# Patient Record
Sex: MALE | Race: WHITE
[De-identification: names, ages, dates, MRNs, and addresses within clinical notes are randomized per-mention and may not be internally consistent; named-entity substitution may affect disease eponyms.]

---

## 2022-03-25 ENCOUNTER — HOSPITAL ENCOUNTER (EMERGENCY)
Dept: HOSPITAL 47 - EC | Age: 42
Discharge: HOME | End: 2022-03-25
Payer: COMMERCIAL

## 2022-03-25 VITALS — TEMPERATURE: 98.6 F | HEART RATE: 67 BPM

## 2022-03-25 VITALS — SYSTOLIC BLOOD PRESSURE: 145 MMHG | RESPIRATION RATE: 18 BRPM | DIASTOLIC BLOOD PRESSURE: 86 MMHG

## 2022-03-25 DIAGNOSIS — Z48.00: Primary | ICD-10-CM

## 2022-03-25 DIAGNOSIS — Z91.040: ICD-10-CM

## 2022-03-25 PROCEDURE — 99282 EMERGENCY DEPT VISIT SF MDM: CPT

## 2022-03-25 NOTE — ED
General Adult HPI





- General


Chief complaint: Skin/Abscess/Foreign Body


Stated complaint: surgical glue came unstuck


Time Seen by Provider: 03/25/22 17:45


Source: patient


Mode of arrival: ambulatory


Limitations: no limitations





- History of Present Illness


Initial comments: 





This 41-year-old male presents emergency Department with complaint of drainage 

from laparoscopic hernia surgery.  Patient states he underwent laparoscopic 

hernia surgery 2 left side of his abdomen and mid February.  Patient states the 

laparoscopic incision sites were adhered with Dermabond which did feel off on 

its own about 2 weeks ago.  Patient states he did see his surgeon,  

around that time, however the glue was still on during that follow-up 

appointment.  Patient states since the glue has peeled off he has been noticing 

some mild drainage from the sites.  He states that there are 3 incision sites.  

Patient states he did get an ALLERGIC reaction from Band-Aids over the last week

and did have some itching around where his bandages were placed which Dr. barajas

instructed him to get Benadryl cream for.  Patient states he has been using 

Benadryl cream on his skin and has been using triple antibiotic ointment along 

with hydrogen peroxide on the actual incision sites.  Patient denies any pain or

swelling around the incision sites.  Patient denies any fevers.  Patient denies 

any chest pain, shortness of breath, nausea, vomiting, change in bowel or 

bladder, change in appetite, headache, lightheadedness, dizziness.





- Related Data


                                    Allergies











Allergy/AdvReac Type Severity Reaction Status Date / Time


 


adhesive tape AdvReac  Rash/Hives Verified 03/25/22 16:03














Review of Systems


ROS Statement: 


Those systems with pertinent positive or pertinent negative responses have been 

documented in the HPI.





ROS Other: All systems not noted in ROS Statement are negative.





Past Medical History


Past Medical History: No Reported History


History of Any Multi-Drug Resistant Organisms: None Reported


Past Surgical History: No Surgical Hx Reported, Hernia Repair


Past Psychological History: No Psychological Hx Reported


Smoking Status: Never smoker


Past Alcohol Use History: Occasional


Past Drug Use History: None Reported





General Exam


Limitations: no limitations


General appearance: alert, in no apparent distress


Head exam: Present: atraumatic, normocephalic, normal inspection


Eye exam: Present: normal appearance, PERRL, EOMI.  Absent: scleral icterus, 

conjunctival injection, periorbital swelling


ENT exam: Present: normal exam, mucous membranes moist


Neck exam: Present: normal inspection, full ROM.  Absent: tenderness, 

meningismus, lymphadenopathy


Respiratory exam: Present: normal lung sounds bilaterally.  Absent: respiratory 

distress, wheezes, rales, rhonchi, stridor


Cardiovascular Exam: Present: regular rate, normal rhythm, normal heart sounds. 

Absent: systolic murmur, diastolic murmur, rubs, gallop, clicks


GI/Abdominal exam: Present: soft, normal bowel sounds, other (Patient with three

1cm incisions on left side of abdomen. Mild erythema around lowest incision in 

the shape of Band-Aid that was placed over incision. No pus, bleeding/bruising 

from incision sites. No tenderness, warmth or sign of infection around incision 

sites. Incision sites closed+scab formation).  Absent: distended, tenderness 

(Patient without tenderness abdomen.  3 incision sites present without any 

drainage, bleeding or pus at this time.  No sign of infection over incision 

sites.), guarding, rebound, rigid


Extremities exam: Present: normal inspection, full ROM, normal capillary refill.

 Absent: tenderness, pedal edema, joint swelling, calf tenderness


Back exam: Absent: CVA tenderness (R), CVA tenderness (L), paraspinal 

tenderness, vertebral tenderness


Neurological exam: Present: alert, oriented X3, CN II-XII intact


Psychiatric exam: Present: normal affect, normal mood


Skin exam: Present: warm, dry, intact, normal color.  Absent: rash





Course


                                   Vital Signs











  03/25/22





  15:59


 


Temperature 98.6 F


 


Pulse Rate 67


 


Respiratory 16





Rate 


 


Blood Pressure 158/95














Medical Decision Making





- Medical Decision Making





This 41-year-old female presents emergency Department wondering if he should 

have his incision sites reclosed after hernia surgery in mid February.  On 

examination of 3 abdominal incision sites there is no sign of infection.  Scab 

formation present over all 3 incision sites, however patient states that the 

scabs to often fall off and the edges of the incisions seem to slightly open, 

however there is no sign of this at this time.  Patient denies any fever, 

bleeding of incisions, pain around the incisions.  Bacitracin ointment placed 

over her incision sites.  Patient instructed not to put hydrogen peroxide over 

incisions.  I did instruct patient to not use Band-Aids that caused erythema

/blistering to his abdomen earlier this week.  Patient instructed to follow up 

with his primary care provider or surgeon next week.  Strict return precautions 

were discussed.  Patient verbally agreed to plan.  Patient sent home in stable 

condition.  Case discussed in detail with my attending,  who suggested 

that I do not place anything over top of incisions and just apply bacitracin 

ointment.





Disposition


Clinical Impression: 


 Encounter for wound re-check





Disposition: HOME SELF-CARE


Condition: Stable


Instructions (If sedation given, give patient instructions):  Bacitracin (On the

skin), Incisional Hernia (DC)


Additional Instructions: 


Please follow-up with your primary care provider or  early next week.  

Return to the emergency department with any new, worsening, or concerning 

symptoms.  Apply bacitracin ointment as directed


Is patient prescribed a controlled substance at d/c from ED?: No


Referrals: 


None,Stated [Primary Care Provider] - 1-2 days


Paul Aguila [STAFF PHYSICIAN] - 1-2 days


Time of Disposition: 18:42

## 2023-08-04 ENCOUNTER — HOSPITAL ENCOUNTER (OUTPATIENT)
Dept: HOSPITAL 47 - RADUSWWP | Age: 43
Discharge: HOME | End: 2023-08-04
Attending: INTERNAL MEDICINE
Payer: COMMERCIAL

## 2023-08-04 DIAGNOSIS — Z87.19: ICD-10-CM

## 2023-08-04 DIAGNOSIS — K76.0: Primary | ICD-10-CM

## 2023-08-04 PROCEDURE — 76700 US EXAM ABDOM COMPLETE: CPT

## 2023-08-04 PROCEDURE — 76857 US EXAM PELVIC LIMITED: CPT

## 2023-08-04 NOTE — US
EXAMINATION TYPE: US abdomen comp/pelvis limited

 

DATE OF EXAM: 8/4/2023

 

COMPARISON: NONE

 

CLINICAL INDICATION: Male, 42 years old with history of R10.84 GENERALIZED ABDOMINAL PAIN; Generalize
d pain. Hx umbilical hernia. 

 

EXAM MEASUREMENTS:

 

Liver Length:  16.0 cm   

Gallbladder Wall:  0.1 cm   

CBD:  0.3 cm

Spleen:  12.7 cm   

Right Kidney:  10.4 x 4.8 x 5.5 cm 

Left Kidney:  11.2 x 4.9 x 5.1 cm  

 

Pancreas:  Head and tail obscured by bowel gas

Liver:  Echogenic, heterogenous and coarse  

Gallbladder:  No stones, sludge or wall thickening seen

CBD:  wnl 

Spleen:  wnl   

Right Kidney:  No hydronephrosis or masses seen   

Left Kidney:  Medial lower pole anechoic lesion = 1.8 x 1.7 x 1.6 cm    

Upper IVC:  wnl  

Abd Aorta:  Mid portion obscured by overlying bowel gas

Bladder: anechoic

**Left jet seen

 

The visualized portions of the pancreas unremarkable. The tail and head are obscured by overlying bow
el gas. The liver demonstrates an echogenic heterogenous and coarse appearance without focal lesion. 
Gallbladder is unremarkable without evidence of pericholecystic fluid, cholelithiasis, or wall thicke
angelia. Common bile duct within normal limits. Spleen is within normal limits. 

 

Both kidneys dimension no evidence of solid mass, hydronephrosis, nephrolithiasis. Simple cyst identi
fied within the left mid kidney measuring up to 1.8 cm. The visualized portions of the upper IVC and 
abdominal aorta are within normal limits. The midportion of the abdominal aorta is obscured by overly
ing bowel gas. The bladder is anechoic with only the left lateral jet identified.

 

IMPRESSION: 

1. No acute process.

2. Hepatic steatosis.

## 2025-02-25 ENCOUNTER — HOSPITAL ENCOUNTER (OUTPATIENT)
Dept: HOSPITAL 47 - 3 N SLEEP | Age: 45
End: 2025-02-25
Attending: INTERNAL MEDICINE
Payer: COMMERCIAL

## 2025-02-25 VITALS
HEART RATE: 67 BPM | TEMPERATURE: 98 F | SYSTOLIC BLOOD PRESSURE: 130 MMHG | RESPIRATION RATE: 16 BRPM | DIASTOLIC BLOOD PRESSURE: 83 MMHG

## 2025-02-25 DIAGNOSIS — R06.83: Primary | ICD-10-CM

## 2025-02-25 DIAGNOSIS — F41.9: ICD-10-CM

## 2025-02-25 DIAGNOSIS — Z91.09: ICD-10-CM

## 2025-02-25 PROCEDURE — 99211 OFF/OP EST MAY X REQ PHY/QHP: CPT

## 2025-02-25 NOTE — P.SLEEP
History of Present Illness


H&P Date: 02/25/25





44-year-old young male patient referred to me due to concerns of sleep apnea.  

The patient is experiencing excessive fatigue and sleepiness during the day and 

his current Kendalia score is at 16.  He has loud snoring.  He has been noted to 

have occasional apneas by family members.  He also wakes up with a dry mouth.  

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  He works 4 

days a week.  On working days, he goes to bed between 830 and 9 AM and he gets 

out of bed at around 2 PM.  On days off, he goes to bed around 1 AM and usually 

gets out of bed between 6 and 7 AM in the morning.  As such, he is averaging 6 

hours of sleep.  Takes a few minutes to fall asleep.  He sleeps on his back and 

he naps whenever he can.  These are short naps.  His weight has remained stable 

over the past 5 years without any significant weight gain or weight loss.  He 

drinks alcohol socially.  Occasionally, he consumes an energy drink to get 

himself stimulated.  No substance abuse.  No alcoholism.  No smoking.  No head 

trauma.  No sleep paralysis.  No hallucinations.  No cataplexy.  No sleepwalking

or sleep talking.  No nighttime awakening for shortness of breath or chest pain.

 No personal or family history of obstructive sleep apnea.  No other major 

comorbidities.  Due to concern of sleep apnea, the patient was referred to me.





Review of Systems


Constitutional: Reports daytime sleepiness, Reports fatigue


Eyes: denies as per HPI, denies blurred vision, denies bulging eye, denies 

decreased vision, denies diplopia, denies discharge, denies dry eye, denies 

irritation, denies itching, denies pain, denies photophobia, denies loss of 

peripheral vision, denies loss of vision, denies tunnel vision/blind spots


Ears: deny: decreased hearing, ear discharge, earache, tinnitus


Ears, nose, mouth and throat: Reports as per HPI


Cardiovascular: Reports as per HPI


Respiratory: Reports snoring


Gastrointestinal: Reports as per HPI


Genitourinary: Reports as per HPI


Musculoskeletal: Reports as per HPI


Musculoskeletal: absent: ankle pain, ankle stiffness, ankle swelling, as per 

HPI, elbow pain, elbow stiffness, elbow swelling, foot pain, foot stiffness, 

foot swelling, hand pain, hand stiffness, hand swelling, hip pain, hip st

iffness, hip swelling, knee pain, knee stiffness, knee swelling, shoulder pain, 

shoulder stiffness, shoulder swelling, wrist pain, wrist stiffness, wrist 

swelling


Integumentary: Reports as per HPI


Neurological: Reports as per HPI


Psychiatric: Reports as per HPI, Reports hypersomnia, Reports sleep disturbances


Endocrine: Reports as per HPI


Hematologic/Lymphatic: Reports as per HPI


Allergic/Immunologic: Reports as per HPI





Past Medical History


Past Medical History: No Reported History


Additional Past Medical History / Comment(s): Arthritis - thumbs, 3 knee 

surgeries - arthritis now.


History of Any Multi-Drug Resistant Organisms: None Reported


Past Surgical History: Hernia Repair


Additional Past Surgical History / Comment(s): 2 left ACL knee surgeries, 1 

right knee meniscus, umbilical hernia, bilateral lasik eye surgery, wisdom 

teeth, kidney stones


Past Psychological History: No Psychological Hx Reported


Smoking Status: Never smoker


Past Alcohol Use History: Occasional


Past Drug Use History: None Reported





- Past Family History


  ** Father


Family Medical History: Hypertension


Additional Family Medical History / Comment(s): (Sister also with HTN and heart 

problem)





  ** Mother


Family Medical History: CVA/TIA, Diabetes Mellitus





Medications and Allergies


Home Medications and Allergies Comment(s): 





Vitamin D and probiotic


                                    Allergies











Allergy/AdvReac Type Severity Reaction Status Date / Time


 


adhesive tape AdvReac  Rash/Hives Verified 03/25/22 16:03














Physical Exam


Vitals: 


                                   Vital Signs











  Temp Pulse Resp BP Pulse Ox


 


 02/25/25 14:37  98.0 F  67  16  130/83  96








                                Intake and Output











 02/25/25 02/25/25 02/25/25





 06:59 14:59 22:59


 


Other:   


 


  Weight  90.889 kg 














The patient appeared well nourished and normally developed. Vital signs as 

documented.  Body mass index is 29.6


Head exam is unremarkable. No scleral icterus or corneal arcus noted. 


Neck is without jugular venous distension, thyromegaly, or carotid bruits. 

Carotid upstrokes are brisk bilaterally.  The patient has a Mallampati class III


Lungs are clear to auscultation and percussion. 


Cardiac exam reveals the PMI to be normally sized and situated. Rhythm is 

regular. First and second heart sounds normal. No murmurs, rubs or gallops. 


Abdominal exam reveals normal bowel sounds, no masses, no organomegaly and no 

aortic enlargement. 


Extremities are nonedematous and both femoral and pedal pulses are normal.


Examination of the skin revealed no evidence of significant rashes, suspicious 

appearing nevi or other concerning lesions.


Neurologically, the patient is awake and alert and the patient does not have any

focal neurological deficit.  Cranial nerves are essentially intact.





Assessment and Plan


Plan: 





Chronic fatigue/sleepiness with an Kendalia score of 16, suspect obstructive 

sleep apnea.





Snoring with questionable apneas overnight.





Night shift worker





No major comorbidities.  Patient is otherwise healthy





Plan





The patient was given advised on sleep knowing that he is a midnight shift 

worker.  He needs to aim for a consistent sleep schedule even on days off.  

During those working days, the patient needs to go to bed in the early morning 

hours and block out daytime noise to ensure sleep quality.


Patient was advised to eat balanced meals with protein and complex carbohydrates

to maintain energy levels and stay hydrated throughout the midnight shift


Advised to limit caffeine intake specially close to bedtime as this may disrupt 

his sleep


Advised to engage in moderate exercise after night shift to promote healthy sle

ep


May utilize breaks times for short naps and dark and quiet spaces


Proceed with a screening polysomnogram.  This will be a nighttime study to rule 

out the possibility of obstructive sleep apnea and treat accordingly.





Sleep Note





- Sleep Data


ESS Total: 16





- Sleep Note


Sleep Note: 


Temperature: 98.0 F


Pulse Rate: 67


Respiratory Rate: 16


Blood Pressure: 130/83


SpO2: 96


Height: 5 ft 9 in


Weight: 90.889 kg


BMI: 


Neck Circumference: 17

## 2025-03-25 ENCOUNTER — HOSPITAL ENCOUNTER (OUTPATIENT)
Dept: HOSPITAL 47 - 3 N SLEEP | Age: 45
LOS: 1 days | Discharge: HOME | End: 2025-03-26
Attending: INTERNAL MEDICINE
Payer: COMMERCIAL

## 2025-03-25 DIAGNOSIS — Z91.048: ICD-10-CM

## 2025-03-25 DIAGNOSIS — R53.82: ICD-10-CM

## 2025-03-25 DIAGNOSIS — G47.33: Primary | ICD-10-CM

## 2025-03-25 PROCEDURE — 95810 POLYSOM 6/> YRS 4/> PARAM: CPT

## 2025-04-06 NOTE — P.PCN
Date of Procedure: 03/25/25


Operative Findings: 











Polysomnography report





Date of service is 3/25/2025





History


44-year-old young male patient referred to me due to concerns of sleep apnea.  

The patient is experiencing excessive fatigue and sleepiness during the day and 

his current Regina score is at 16.  He has loud snoring.  He has been noted to 

have occasional apneas by family members.  He also wakes up with a dry mouth.  

He works at the [employer] and currently is undergoing midnight shift.  He works 4 

days a week.  On working days, he goes to bed between 830 and 9 AM and he gets 

out of bed at around 2 PM.  On days off, he goes to bed around 1 AM and usually 

gets out of bed between 6 and 7 AM in the morning.  As such, he is averaging 6 

hours of sleep.  Takes a few minutes to fall asleep.  He sleeps on his back and 

he naps whenever he can.  These are short naps.  His weight has remained stable 

over the past 5 years without any significant weight gain or weight loss.  He 

drinks alcohol socially.  Occasionally, he consumes an energy drink to get 

himself stimulated.  No substance abuse.  No alcoholism.  No smoking.  No head 

trauma.  No sleep paralysis.  No hallucinations.  No cataplexy.  No sleepwalking

or sleep talking.  No nighttime awakening for shortness of breath or chest pain.

 No personal or family history of obstructive sleep apnea.  No other major 

comorbidities.  Due to concern of sleep apnea, the patient was referred to me.





Pertinent physical findings


Weight is 200 pounds with a body mass index of 29.5.








Technical description


The patient was studied using a standard complex polysomnography protocol that 

included recording of the Lead II EKG, Central, occipital and frontal EEG, right

and left outer canthus EOG, submental EMG, right and left anterior tibialis EMG,

respiratory airflow by thermocouple and or pressure/flow transducer, respiratory

efforts by abdominal and thoracic PVDF belts, oxygen saturation by cable 

oximetry.  Position by observation synchronized the PSG. Equipment used: 

[employer].





Sleep architecture


The total recording duration was 388.0 minutes.  The total sleep time was 360.5 

minutes.  Overall sleep urgency was 93.2%.  Latency to sleep onset was 65.5 

minutes.  Sleep architecture was catheterized by 11.2% stage I, 72.4% stage II, 

0% stage III, 16.6% REM sleep.





Respiratory analysis


The patient had a total of 0 obstructive apneas and 39 obstructive hypopneas.  

The resulting AHI was 6.5 consistent with mild obstructive sleep apnea.  Noted 

the obstructive respiratory events were positional in form of hypopneas, 

predominantly occurring during REM sleep.  AHI during REM was 21.0.  No evidence

of any central apneas.  No mixed apneas.





Oxygenation analysis


The patient had a average pulse ox of 95% while awake.  Lowest pulse ox during 

sleep was 85%.  The patient spent majority of sleep time with a pulse ox of 90% 

and above.  No significant saturations were encountered.





Cardiac summary


Average heart rate was 102 with a minimum heart rate of 48 and the maximum heart

of 64 with a rhythm being sinus.





Arousal summary


The patient had a total of 87 arousals with an arousal index of 14.5.  Majority 

of arousals were spontaneous and somewhat related to obstructive respiratory 

events.  Arousals related to respiratory event included total of 12 events with 

a respiratory arousal index of 2.0.





Limb movement summary


A total of 0 periodic limb movements.





Assessment





Mild REM specific obstructive sleep apnea.  AHI was 6.5, worse during REM sleep.

 Respiratory vents position from obstructive hypopneas, occurring predominantly 

during REM sleep.


Adequate sleep efficiency likely to be at 93.2%.


Adequate sleep architecture


No significant active oxygen saturations


No significant sleep fragmentation


No periodic limb movement activity


Chronic fatigue/sleepiness with an Regina score of 16, suspect obstructive 

sleep apnea.


Snoring.


Night shift worker





Plan





The patient was given advised on sleep knowing that he is a midnight shift 

worker.  He needs to aim for a consistent sleep schedule even on days off.  

During those working days, the patient needs to go to bed in the early morning 

hours and block out daytime noise to ensure sleep quality.


Patient was advised to eat balanced meals with protein and complex carbohydrates

to maintain energy levels and stay hydrated throughout the midnight shift


Advised to limit caffeine intake specially close to bedtime as this may disrupt 

his sleep


Advised to engage in moderate exercise after night shift to promote healthy 

sleep


May utilize breaks times for short naps and dark and quiet spaces


Polysomnography showed only mild disease.  Response to CPAP therapy would not be

of major significance as the patient's disease severity is mild at baseline.  

The patient need to work on losing weight.  Consider oral appliance which may 

help with mild obstructive sleep apnea and snoring.  May offer CPAP therapy in 

the future if the patient symptoms get worse over time.